# Patient Record
Sex: FEMALE | Race: WHITE
[De-identification: names, ages, dates, MRNs, and addresses within clinical notes are randomized per-mention and may not be internally consistent; named-entity substitution may affect disease eponyms.]

---

## 2017-10-04 ENCOUNTER — HOSPITAL ENCOUNTER (OUTPATIENT)
Dept: HOSPITAL 76 - DI.S | Age: 67
Discharge: HOME | End: 2017-10-04
Attending: NURSE PRACTITIONER
Payer: MEDICARE

## 2017-10-04 DIAGNOSIS — R92.8: ICD-10-CM

## 2017-10-04 DIAGNOSIS — Z12.31: Primary | ICD-10-CM

## 2017-10-04 PROCEDURE — 77067 SCR MAMMO BI INCL CAD: CPT

## 2017-10-13 NOTE — MAMMOGRAPHY REPORT
DIGITAL SCREENING MAMMOGRAM: 10/04/2017

 

CLINICAL INDICATION: A 66-year-old nulliparous patient, for screening. 

 

COMPARISON: Films from [place], [place] dated 06/06/2013, 08/26/2009, 05/14/2008. 

 

TECHNIQUE:  Routine CC and MLO projections were obtained of the breasts.

 

FINDINGS:  The breasts again demonstrate heterogeneously dense fibroglandular parenchyma bilaterally.
 In the left lower breast, only well seen on the oblique projection, there is a possible developing a
symmetry. Further evaluation with spot compression views and possible ultrasound is recommended. No m
ammographically suspicious findings are appreciated in the right breast. 

 

IMPRESSION:  INCOMPLETE EXAMINATION. 

 

RECOMMENDATION: ADDITIONAL EVALUATION OF THE LEFT BREAST AS ABOVE. 

 

BIRADS CATEGORY 0-INCOMPLETE. 

 

STANDARD QUALIFYING STATEMENTS

 

1. This examination was reviewed with the aid of Computer-Aided Detection (CAD).

 

2. A negative or benign imaging report should not delay biopsy if clinically suspicious findings are 
present. Consider surgical consultation if warranted. More than 5% of cancers are not identified by i
maging.

 

3. Dense breasts may obscure an underlying neoplasm.

 

 

 

DD:10/13/2017 12:54:31  DT: 10/13/2017 13:02  JOB #: [id number]  EXT JOB #:[id number]

## 2017-10-30 ENCOUNTER — HOSPITAL ENCOUNTER (OUTPATIENT)
Dept: HOSPITAL 76 - DI | Age: 67
Discharge: HOME | End: 2017-10-30
Attending: NURSE PRACTITIONER
Payer: MEDICARE

## 2017-10-30 DIAGNOSIS — R92.8: Primary | ICD-10-CM

## 2017-10-30 NOTE — MAMMOGRAPHY REPORT
DIGITAL DIAGNOSTIC LEFT MAMMOGRAM:  10/30/2017

 

CLINICAL INDICATION:  Possible developing asymmetry left inferior breast.

 

TECHNIQUE:  Left true lateral, repeat MLO, spot compression views.

 

COMPARISON:  10/04/2017, 06/06/2013, 08/26/2009, 05/14/2008

 

FINDINGS:  The left breast again demonstrates heterogeneously dense fibroglandular parenchyma.  The d
ensity questioned in the inferior left breast dissipates on additional compression.  No underlying ma
ss lesion or architectural distortion is appreciated. 

 

IMPRESSION:  NEGATIVE EXAMINATION.

 

RECOMMENDATION:  Routine annual screening unless otherwise clinically indicated.

 

BIRADS CATEGORY 1 - NEGATIVE.

 

STANDARD QUALIFYING STATEMENTS

 

1. This examination was reviewed with the aid of Computer-Aided Detection (CAD).

 

2. A negative or benign imaging report should not delay biopsy if clinically suspicious findings are 
present. Consider surgical consultation if warranted. More than 5% of cancers are not identified by i
maging.

 

3. Dense breasts may obscure an underlying neoplasm.

 

 

 

 

 

DD:10/30/2017 12:45:14  DT: 10/30/2017 14:15  JOB #: [id number]  EXT JOB #:[id number]

## 2017-12-28 ENCOUNTER — HOSPITAL ENCOUNTER (OUTPATIENT)
Dept: HOSPITAL 76 - LAB.F | Age: 67
Discharge: HOME | End: 2017-12-28
Attending: NURSE PRACTITIONER
Payer: MEDICARE

## 2017-12-28 DIAGNOSIS — R53.83: Primary | ICD-10-CM

## 2017-12-28 DIAGNOSIS — Z13.6: ICD-10-CM

## 2017-12-28 DIAGNOSIS — Z13.29: ICD-10-CM

## 2017-12-28 DIAGNOSIS — Z78.9: ICD-10-CM

## 2017-12-28 LAB
ALBUMIN/GLOB SERPL: 1.4 {RATIO} (ref 1–2.2)
ANION GAP SERPL CALCULATED.4IONS-SCNC: 8 MMOL/L (ref 6–13)
BASOPHILS NFR BLD AUTO: 0 10^3/UL (ref 0–0.1)
BASOPHILS NFR BLD AUTO: 0.6 %
BILIRUB BLD-MCNC: 0.8 MG/DL (ref 0.2–1)
BUN SERPL-MCNC: 15 MG/DL (ref 6–20)
CALCIUM UR-MCNC: 9.8 MG/DL (ref 8.5–10.3)
CHLORIDE SERPL-SCNC: 102 MMOL/L (ref 101–111)
CHOLEST SERPL-MCNC: 207 MG/DL
CO2 SERPL-SCNC: 30 MMOL/L (ref 21–32)
CREAT SERPLBLD-SCNC: 0.8 MG/DL (ref 0.4–1)
EOSINOPHIL # BLD AUTO: 0.3 10^3/UL (ref 0–0.7)
EOSINOPHIL NFR BLD AUTO: 4.6 %
ERYTHROCYTE [DISTWIDTH] IN BLOOD BY AUTOMATED COUNT: 13.4 % (ref 12–15)
GFRSERPLBLD MDRD-ARVRAT: 71 ML/MIN/{1.73_M2} (ref 89–?)
GLOBULIN SER-MCNC: 2.8 G/DL (ref 2.1–4.2)
GLUCOSE SERPL-MCNC: 80 MG/DL (ref 70–100)
HCT VFR BLD AUTO: 41.4 % (ref 37–47)
HDLC SERPL-MCNC: 76 MG/DL
HDLC SERPL: 2.7 {RATIO} (ref ?–4.4)
HGB UR QL STRIP: 13.8 G/DL (ref 12–16)
LDLC SERPL-MCNC: 134 MG/DL
LYMPHOCYTES # SPEC AUTO: 2.1 10^3/UL (ref 1.5–3.5)
LYMPHOCYTES NFR BLD AUTO: 35.9 %
MCH RBC QN AUTO: 32.3 PG (ref 27–31)
MCHC RBC AUTO-ENTMCNC: 33.4 G/DL (ref 32–36)
MCV RBC AUTO: 96.7 FL (ref 81–99)
MONOCYTES # BLD AUTO: 0.5 10^3/UL (ref 0–1)
MONOCYTES NFR BLD AUTO: 7.9 %
NEUTROPHILS # BLD AUTO: 3 10^3/UL (ref 1.5–6.6)
NEUTROPHILS # SNV AUTO: 5.9 X10^3/UL (ref 4.8–10.8)
NEUTROPHILS NFR BLD AUTO: 51 %
NRBC # BLD AUTO: 0 /100WBC
PDW BLD AUTO: 8.8 FL (ref 7.9–10.8)
POTASSIUM SERPL-SCNC: 3.9 MMOL/L (ref 3.5–5)
PROT SPEC-MCNC: 6.7 G/DL (ref 6.7–8.2)
RBC MAR: 4.28 10^6/UL (ref 4.2–5.4)
SODIUM SERPLBLD-SCNC: 140 MMOL/L (ref 135–145)
TRIGL P FAST SERPL-MCNC: 31 MG/DL
WBC # BLD: 5.9 X10^3/UL

## 2017-12-28 PROCEDURE — 86803 HEPATITIS C AB TEST: CPT

## 2017-12-28 PROCEDURE — 85025 COMPLETE CBC W/AUTO DIFF WBC: CPT

## 2017-12-28 PROCEDURE — 84443 ASSAY THYROID STIM HORMONE: CPT

## 2017-12-28 PROCEDURE — 80053 COMPREHEN METABOLIC PANEL: CPT

## 2017-12-28 PROCEDURE — 80061 LIPID PANEL: CPT

## 2017-12-28 PROCEDURE — 36415 COLL VENOUS BLD VENIPUNCTURE: CPT

## 2021-08-27 ENCOUNTER — HOSPITAL ENCOUNTER (OUTPATIENT)
Dept: HOSPITAL 76 - EMS | Age: 71
Discharge: ANOTHER HOSPITAL | End: 2021-08-27
Payer: MEDICARE

## 2021-08-27 DIAGNOSIS — R29.898: ICD-10-CM

## 2021-08-27 DIAGNOSIS — R29.810: Primary | ICD-10-CM

## 2021-08-27 DIAGNOSIS — R47.81: ICD-10-CM

## 2022-05-03 ENCOUNTER — HOSPITAL ENCOUNTER (OUTPATIENT)
Dept: HOSPITAL 76 - DI.S | Age: 72
Discharge: HOME | End: 2022-05-03
Attending: PHYSICIAN ASSISTANT
Payer: MEDICARE

## 2022-05-03 DIAGNOSIS — J06.9: Primary | ICD-10-CM

## 2022-05-03 NOTE — XRAY REPORT
PROCEDURE:  Chest 2 View X-Ray

 

INDICATIONS:  UPPER RESPIRATORY INFECTION

 

TECHNIQUE:  2 view(s) of the chest.  

 

COMPARISON:  None.

 

FINDINGS:  

 

Surgical changes and devices:  None.  

 

Lungs and pleura:  No pleural effusions or pneumothorax.  Lungs are clear.  

 

Mediastinum:  Mediastinal contours are normal.  Heart size is normal.  

 

Bones and chest wall:  No suspicious bony abnormalities.  Soft tissues appear unremarkable.  

 

IMPRESSION:  Chest without acute cardiopulmonary abnormalities or focal airspace disease.

 

Reviewed by: Gilmar Drake MD on 5/3/2022 6:51 PM PDT

Approved by: Gilmar Drake MD on 5/3/2022 6:51 PM PDT

 

 

Station ID:  [id number]

## 2022-07-01 ENCOUNTER — HOSPITAL ENCOUNTER (OUTPATIENT)
Dept: HOSPITAL 76 - EMS | Age: 72
Discharge: ANOTHER HOSPITAL | End: 2022-07-01
Payer: MEDICARE

## 2022-07-01 DIAGNOSIS — R53.1: ICD-10-CM

## 2022-07-01 DIAGNOSIS — R47.9: ICD-10-CM

## 2022-07-01 DIAGNOSIS — R29.810: Primary | ICD-10-CM
